# Patient Record
(demographics unavailable — no encounter records)

---

## 2025-07-24 NOTE — HISTORY OF PRESENT ILLNESS
[TextBox_4] : GERALDINE LEWIS is a 74 year-old male who presents to the office today for initial pulmonary evaluation. Patient presents via the kind courtesy of Dr. Yadiel Salvador with regard to bilateral pleural effusion. Patient had an abdominal ultrasound completed recently which showed no fluid in the abdomen, but did show bilateral pleural effusions. Patient reports he had difficulty breathing over the last 3 weeks, also had worsening bilateral leg edema, but he is now doing better after starting Entresto and furosemide. 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 Patient is currently on Entresto.  [YearQuit] : 1995

## 2025-07-24 NOTE — ASSESSMENT
[FreeTextEntry1] : Advised patient that his exertional dyspnea is most likely due to CHF exacerbation.  Yue should continue with current Entresto and furosemide for CHF.   Advised Yue on fluid restriction and low-salt diet.  Return for pulmonary follow up in 2 months for a repeat Chest X-Ray.

## 2025-07-24 NOTE — ADDENDUM
[FreeTextEntry1] : This note was written by Isabel Lara on 07/23/2025 acting as medical scribe for Dr. Savanna Amato. I, Dr. Savanna Amato, have read and attest that all the information, medical decision making and discharge instructions within are true and accurate.  · Now having symptoms of left arm claudication and numbness  · Plan left subclavian angiography at the setting of left heart catheterization

## 2025-07-24 NOTE — PROCEDURE
[FreeTextEntry1] : Pulmonary Function Test performed today, 07/23/2025, in my office:  Spirometry: Mild obstructive airway disease. Lung Volume: Within normal limits. Diffusion: Moderate impairment.  ______________________________________________________________  Exhaled Nitric Oxide             Final  No Documents Attached    	Test  	Result  	Flag	Reference	Goal	Last Verified  	Exhaled Nitric Oxide	19	 	 		REQUIRED  Ordered by: ODESSA TYLER       Collected/Examined: 23-Jul-2025 03:09 pm       Verification Required       Stage: Final       Performed at: In Office       Performed by: ODESSA TYLER       Resulted: 23-Jul-2025 03:09 pm       Last Updated: 23-Jul-2025 03:09 pm       --------------------------------  Xray Chest 2 Views PA/Lat             Final  No Documents Attached    	 Chest x-ray PA and lateral views performed in my office today showed s/p CABG/loop recorders, mild blunting of left costophrenic angle, consistent with a tiny left pleural effusion, otherwise clear lungs, no evidence of infiltrates.  Ordered by: ODESSA TYLER       Collected/Examined: 23-Jul-2025 03:16 pm       Verification Required       Stage: Final       Performed at: In Office       Performed by: ODESSA TYLER       Resulted: 23-Jul-2025 03:16 pm       Last Updated: 23-Jul-2025 03:17 pm

## 2025-07-24 NOTE — REASON FOR VISIT
[Consultation] : a consultation [Shortness of Breath] : shortness of breath [Spouse] : spouse [Abnormal CXR/ Chest CT] : an abnormal CXR/ chest CT

## 2025-07-24 NOTE — CONSULT LETTER
[Dear  ___] : Dear  [unfilled], [Courtesy Letter:] : I had the pleasure of seeing your patient, [unfilled], in my office today. [Please see my note below.] : Please see my note below. [Consult Closing:] : Thank you very much for allowing me to participate in the care of this patient.  If you have any questions, please do not hesitate to contact me. [Sincerely,] : Sincerely, [FreeTextEntry3] : Dr. Savanna Amato

## 2025-07-24 NOTE — ADDENDUM
[FreeTextEntry1] : This note was written by Isabel Lara on 07/23/2025 acting as medical scribe for Dr. Savanna Amato. I, Dr. Savanna Amato, have read and attest that all the information, medical decision making and discharge instructions within are true and accurate.